# Patient Record
Sex: FEMALE | Race: WHITE | NOT HISPANIC OR LATINO
[De-identification: names, ages, dates, MRNs, and addresses within clinical notes are randomized per-mention and may not be internally consistent; named-entity substitution may affect disease eponyms.]

---

## 2019-07-29 ENCOUNTER — TRANSCRIPTION ENCOUNTER (OUTPATIENT)
Age: 54
End: 2019-07-29

## 2021-10-18 ENCOUNTER — TRANSCRIPTION ENCOUNTER (OUTPATIENT)
Age: 56
End: 2021-10-18

## 2024-03-11 ENCOUNTER — OFFICE (OUTPATIENT)
Dept: URBAN - METROPOLITAN AREA CLINIC 100 | Facility: CLINIC | Age: 59
Setting detail: OPHTHALMOLOGY
End: 2024-03-11
Payer: COMMERCIAL

## 2024-03-11 DIAGNOSIS — H53.40: ICD-10-CM

## 2024-03-11 DIAGNOSIS — H02.831: ICD-10-CM

## 2024-03-11 DIAGNOSIS — H02.834: ICD-10-CM

## 2024-03-11 PROBLEM — H52.7 REFRACTIVE ERROR: Status: ACTIVE | Noted: 2024-03-11

## 2024-03-11 PROBLEM — Z41.1 ENCOUNTER FOR COSMETIC SURGERY: Status: ACTIVE | Noted: 2024-03-11

## 2024-03-11 PROCEDURE — 92285 EXTERNAL OCULAR PHOTOGRAPHY: CPT | Performed by: OPHTHALMOLOGY

## 2024-03-11 PROCEDURE — 92081 LIMITED VISUAL FIELD XM: CPT | Performed by: OPHTHALMOLOGY

## 2024-03-11 PROCEDURE — 99204 OFFICE O/P NEW MOD 45 MIN: CPT | Performed by: OPHTHALMOLOGY

## 2024-03-11 ASSESSMENT — LID POSITION - DERMATOCHALASIS
OS_DERMATOCHALASIS: LUL
OD_DERMATOCHALASIS: RUL

## 2024-07-31 ENCOUNTER — NON-APPOINTMENT (OUTPATIENT)
Age: 59
End: 2024-07-31

## 2024-08-02 ENCOUNTER — APPOINTMENT (OUTPATIENT)
Dept: ORTHOPEDIC SURGERY | Facility: CLINIC | Age: 59
End: 2024-08-02

## 2024-08-02 VITALS — WEIGHT: 118 LBS | HEIGHT: 66 IN | BODY MASS INDEX: 18.96 KG/M2

## 2024-08-02 DIAGNOSIS — Z78.9 OTHER SPECIFIED HEALTH STATUS: ICD-10-CM

## 2024-08-02 DIAGNOSIS — Z87.19 PERSONAL HISTORY OF OTHER DISEASES OF THE DIGESTIVE SYSTEM: ICD-10-CM

## 2024-08-02 DIAGNOSIS — S62.609A FRACTURE OF UNSPECIFIED PHALANX OF UNSPECIFIED FINGER, INITIAL ENCOUNTER FOR CLOSED FRACTURE: ICD-10-CM

## 2024-08-02 DIAGNOSIS — M81.0 AGE-RELATED OSTEOPOROSIS W/OUT CURRENT PATHOLOGICAL FRACTURE: ICD-10-CM

## 2024-08-02 PROBLEM — Z00.00 ENCOUNTER FOR PREVENTIVE HEALTH EXAMINATION: Status: ACTIVE | Noted: 2024-08-02

## 2024-08-02 PROCEDURE — 99203 OFFICE O/P NEW LOW 30 MIN: CPT
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
## 2024-08-02 NOTE — IMAGING
[de-identified] : Right ring and middle finger with ecchymosis and swelling, ski intact. +ttp at P2/P3.  Able to gently flex and extend at MCP, PIP and DIP with no rotational deformity. Sensation intact at radial and ulnar pulp. <2sec cap refill.  Right hand radiographs with ring finger middle phalanx head fracture, ring finger distal phalanx base fracture, minimally displaced. (as viewed from outside facility)

## 2024-08-02 NOTE — ASSESSMENT
[FreeTextEntry1] : Right ring finger middle phalanx head fracture, ring finger distal phalanx base fracture, minimally displaced - reviewed radiographs and pathoanatomy with patient. Discussed the current alignment both radiographically and clinically are within acceptable parameters to manage nonoperatively. Will manage in coban lisa tape, ROM permitted, NWB. Elevate. Discussed risks of pain, stiffness, and displacement requiring intervention. OT for ulnar gutter splint, hand based provided.  F/u 3 week; repeat films

## 2024-08-26 ENCOUNTER — APPOINTMENT (OUTPATIENT)
Dept: ORTHOPEDIC SURGERY | Facility: CLINIC | Age: 59
End: 2024-08-26
Payer: COMMERCIAL

## 2024-08-26 VITALS — WEIGHT: 118 LBS | BODY MASS INDEX: 18.96 KG/M2 | HEIGHT: 66 IN

## 2024-08-26 DIAGNOSIS — S62.609A FRACTURE OF UNSPECIFIED PHALANX OF UNSPECIFIED FINGER, INITIAL ENCOUNTER FOR CLOSED FRACTURE: ICD-10-CM

## 2024-08-26 PROCEDURE — 99203 OFFICE O/P NEW LOW 30 MIN: CPT

## 2024-08-26 PROCEDURE — 73130 X-RAY EXAM OF HAND: CPT | Mod: RT

## 2024-08-26 NOTE — IMAGING
[de-identified] : Right ring and middle finger with improving ecchymosis and swelling, ski intact. +ttp at P2/P3.  Able to gently flex and extend at MCP, PIP and DIP with no rotational deformity. Sensation intact at radial and ulnar pulp. <2sec cap refill.  Right hand radiographs with ring finger middle phalanx head fracture, middle finger distal phalanx base fracture, minimally displaced. Alignment maintained.

## 2024-08-26 NOTE — ASSESSMENT
[FreeTextEntry1] : Right ring finger middle phalanx head fracture, ring finger distal phalanx base fracture, minimally displaced - reviewed radiographs and pathoanatomy with patient. Discussed the current alignment both radiographically and clinically are within acceptable parameters to manage nonoperatively. Will manage in coban lisa tape, ROM permitted, NWB. Elevate. Discussed risks of pain, stiffness, and displacement requiring intervention. Ease out of brace, OT for ROM  F/u 4week; repeat films
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
## 2024-09-25 ENCOUNTER — APPOINTMENT (OUTPATIENT)
Dept: ORTHOPEDIC SURGERY | Facility: CLINIC | Age: 59
End: 2024-09-25
Payer: COMMERCIAL

## 2024-09-25 VITALS — HEIGHT: 66 IN | BODY MASS INDEX: 18.96 KG/M2 | WEIGHT: 118 LBS

## 2024-09-25 DIAGNOSIS — S62.609A FRACTURE OF UNSPECIFIED PHALANX OF UNSPECIFIED FINGER, INITIAL ENCOUNTER FOR CLOSED FRACTURE: ICD-10-CM

## 2024-09-25 PROCEDURE — 99213 OFFICE O/P EST LOW 20 MIN: CPT

## 2024-09-25 PROCEDURE — 73130 X-RAY EXAM OF HAND: CPT | Mod: RT

## 2024-09-25 NOTE — IMAGING
[de-identified] : Right ring and middle finger with resolved ecchymosis and swelling, ski intact. -ttp at P2/P3.  Able to nearly fully flex and extend at MCP, PIP and DIP with no rotational deformity. Sensation intact at radial and ulnar pulp. <2sec cap refill.  Right hand radiographs with ring finger middle phalanx head fracture, middle finger distal phalanx base fracture, minimally displaced. Alignment maintained. +callus.
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
## 2024-09-25 NOTE — IMAGING
[de-identified] : Right ring and middle finger with resolved ecchymosis and swelling, ski intact. -ttp at P2/P3.  Able to nearly fully flex and extend at MCP, PIP and DIP with no rotational deformity. Sensation intact at radial and ulnar pulp. <2sec cap refill.  Right hand radiographs with ring finger middle phalanx head fracture, middle finger distal phalanx base fracture, minimally displaced. Alignment maintained. +callus.
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
## 2024-09-25 NOTE — ASSESSMENT
[FreeTextEntry1] : Right ring finger middle phalanx head fracture, ring finger distal phalanx base fracture, minimally displaced - reviewed radiographs and pathoanatomy with patient. Discussed the current alignment both radiographically and clinically are within acceptable parameters to manage nonoperatively. Will manage in coban lisa tape, ROM permitted, WBAT. Elevate. Discussed risks of pain, stiffness, and displacement requiring intervention. OT for ROM  F/u prn; repeat films

## 2025-03-15 PROBLEM — H35.373 EPIRETINAL MEMBRANE; BOTH EYES: Status: ACTIVE | Noted: 2025-03-15

## 2025-03-15 PROBLEM — H53.19 SUBJECTIVE VISUAL DISTURBANCES, OTHER: Status: ACTIVE | Noted: 2025-03-15

## 2025-03-15 PROBLEM — H43.812 POSTERIOR VITREOUS DETACHMENT; LEFT EYE: Status: ACTIVE | Noted: 2025-03-15

## 2025-03-15 PROBLEM — H43.393 VITREOUS FLOATERS; BOTH EYES: Status: ACTIVE | Noted: 2025-03-15
